# Patient Record
(demographics unavailable — no encounter records)

---

## 2021-01-01 NOTE — EDM.PDOC
ED HPI GENERAL MEDICAL PROBLEM





- General


Chief Complaint: Eye Problems


Stated Complaint: FEVER/RED EYES/COUGH


Time Seen by Provider: 10/31/21 16:11


Source of Information: Reports: Patient, RN Notes Reviewed


History Limitations: Reports: No Limitations





- History of Present Illness


INITIAL COMMENTS - FREE TEXT/NARRATIVE: 





Clarita presents with his mother.  She reports Clarita has had fever, sniffles and 

redness of bilateral eyes for the past 24 hours.  She reports he is exclusively 

 and has had decreased feedings with one wet diaper since 1130 today.  

She states her older son who is 7 does go to school, he does not interact 

frequently with Clarita.  She denies concerns of any irritant or chemical to the 

eyes. She denies patient having vomiting or diarrhea. She denies any other 

concerns. 





- Related Data


                                    Allergies











Allergy/AdvReac Type Severity Reaction Status Date / Time


 


No Known Allergies Allergy   Verified 07/07/21 16:51














ED ROS GENERAL





- Review of Systems


Review Of Systems: See Below


Constitutional: Reports: Fever, Other (fussiness, decreased feedings, last wet 

diaper 1130 today)


HEENT: Reports: Other (sinus congestion, redness of eyes bilaral).  Denies: Ear 

Discharge, Eye Discharge


Respiratory: Reports: No Symptoms


Cardiovascular: Reports: No Symptoms


Endocrine: Reports: No Symptoms


GI/Abdominal: Reports: No Symptoms


: Reports: No Symptoms


Musculoskeletal: Reports: No Symptoms


Skin: Denies: Cyanosis, Jaundice, Mottled, Pallor, Diaphoresis, Bruising, Rash, 

Change in Color


Neurological: Reports: Other (consolable)


Psychiatric: Reports: Other (consolable)


Hematologic/Lymphatic: Reports: No Symptoms


Immunologic: Reports: No Symptoms





ED EXAM GENERAL W FULL EYE





- Physical Exam


Exam: See Below


Exam Limited By: No Limitations


General Appearance: Mild Distress, Other (awake, fussy)


Eye Exam: Bilateral Eye: PERRL, Other (no drainage, no discharge or crusting 

noted.  Reddened conjuctiva bilaterally without edema. )


Eyelids: Bilateral: Normal Appearance


Conjunctiva & Sclera: Bilateral: Other (red bilateral conjunctiva without edema)


Cornea Exam: Bilateral: Normal Appearance


Pupils: Normal Accommodation


Pupillary Size: Bilateral: 2 mm


Pupillary Reaction: Bilateral: Brisk


Ears: Normal External Exam, Normal Canal, Hearing Grossly Normal, Normal TMs


Nose: Nasal Drainage (crusting and congestion)


Throat/Mouth: Normal Inspection, Normal Lips, Normal Gums, Normal Oropharynx, 

Normal Voice, No Airway Compromise


Head: Atraumatic, Normocephalic


Neck: Normal Inspection, Supple, Full Range of Motion.  No: Lymphadenopathy (R),

Lymphadenopathy (L)


Respiratory/Chest: Lungs Clear.  No: Decreased Breath Sounds, Crackles, Rales, 

Rhonchi, Wheezing, Stridor


Cardiovascular: No Edema, No Gallop, No Murmur, No Rub, Tachycardia


GI/Abdominal: Normal Bowel Sounds, Soft, Non-Tender, No Organomegaly, No 

Distention, No Abnormal Bruit, No Mass.  No: Guarding, Rigid, Rebound


 (Male) Exam: No Hernia, Normal Inspection, Circumcised


Rectal (Males) Exam: Deferred


Back Exam: Normal Inspection, Full Range of Motion.  No: CVA Tenderness (R), CVA

Tenderness (L)


Extremities: Normal Inspection, Normal Range of Motion, Non-Tender, No Pedal 

Edema, Normal Capillary Refill


Neurological: Other (appropriate for age)


Psychiatric: Normal Affect, Normal Mood


Skin Exam: Warm, Dry, Intact, Normal Color, No Rash


Lymphatic: No Adenopathy





Course





- Vital Signs


Last Recorded V/S: 


                                Last Vital Signs











Temp  38.1 C H  10/31/21 16:58


 


Pulse  181   10/31/21 16:58


 


Resp  40   10/31/21 16:58


 


BP      


 


Pulse Ox  99   10/31/21 16:58














- Orders/Labs/Meds


Orders: 


                               Active Orders 24 hr











 Category Date Time Status


 


 Isolation [COMM] Stat Oth  10/31/21 16:26 Ordered











Labs: 


                                Laboratory Tests











  10/31/21 Range/Units





  16:50 


 


Influenza Type A RNA  Negative  (NEGATIVE)  


 


RSV RNA (INAAT)  Negative  (NEGATIVE)  


 


Influenza Type B RNA  Negative  (NEGATIVE)  


 


SARS-CoV-2 RNA (BARBI)  Negative  (NEGATIVE)  











Meds: 


Medications














Discontinued Medications














Generic Name Dose Route Start Last Admin





  Trade Name Freq  PRN Reason Stop Dose Admin


 


Acetaminophen  88 mg  10/31/21 16:26  10/31/21 16:46





  Acetaminophen 120 Mg Supp  RECTAL  10/31/21 16:27  88 mg





  ONETIME ONE   Administration














- Re-Assessments/Exams


Free Text/Narrative Re-Assessment/Exam: 





10/31/21 17:40


Discussed patient status, vital signs with Dr. Davila, he is in agreement with 

plan. Parents are in agreement with plan. Clarita will be discharged to home, they 

will watch him closely and if he has worsening, they will return.





T= 36.8


-170bpm


[id number] 99% on room air, no retractions or hard work of breathing noted. 


One wet diaper with small amount of urine while in ER. 








Departure





- Departure


Time of Disposition: 17:35


Disposition: Home, Self-Care 01


Condition: Good


Clinical Impression: 


 Conjunctivitis, Viral illness








- Discharge Information


*PRESCRIPTION DRUG MONITORING PROGRAM REVIEWED*: Not Applicable


*COPY OF PRESCRIPTION DRUG MONITORING REPORT IN PATIENT PORTILLO: Not Applicable


Instructions:  Viral Illness, Pediatric


Referrals: 


Cheryl Cook CNM [Primary Care Provider] - 


Forms:  ED Department Discharge


Additional Instructions: 


Clarita has been evaluated and treated for viral illness and bilateral 

conjunctivitis.


His COVID/influenza A/B/RSV swab was negative. 





Tylenol by mouth or per rectum as needed for fever/pain. 





Use a warm washcloth to the eyes for comfort. 


He can have re-wetting drops/artificial tears to the eyes every 4 to 6 hours for

comfort as needed. 


Watch for discharge from the eyes, if green/yellow discharge noted follow up 

with primary. 





Follow up with primary for a recheck in 1 to 3 days to make sure he is doing 

okay.


Allow to feed on demand as you have been doing.


Return to the emergency room for any issues or concerns. 











Sepsis Event Note (ED)





- Focused Exam


Vital Signs: 


                                   Vital Signs











  Temp Pulse Resp Pulse Ox


 


 10/31/21 16:58  38.1 C H  181  40  99


 


 10/31/21 16:06  38.1 C H  181  40  99














- My Orders


Last 24 Hours: 


My Active Orders





10/31/21 16:26


Isolation [COMM] Stat 














- Assessment/Plan


Last 24 Hours: 


My Active Orders





10/31/21 16:26


Isolation [COMM] Stat 











Assessment:: 





 Conjunctivitis


Viral illness


Plan: 


Patient has been evaluated and treated for viral illness and bilateral 

conjunctivitis.


His COVID/influenza A/B/RSV swab was negative. 





Tylenol by mouth or per rectum as needed for fever/pain. 





Use a warm washcloth to the eyes for comfort. 


He can have re-wetting drops/artificial tears to the eyes every 4 to 6 hours for

 comfort as needed. 


Watch for discharge from the eyes, if green/yellow discharge noted follow up 

with primary. 





Follow up with primary for a recheck in 1 to 3 days to make sure he is doing 

okay.


Allow to feed on demand as you have been doing.


Return to the emergency room for any issues or concerns.